# Patient Record
Sex: FEMALE | Race: WHITE
[De-identification: names, ages, dates, MRNs, and addresses within clinical notes are randomized per-mention and may not be internally consistent; named-entity substitution may affect disease eponyms.]

---

## 2019-04-12 ENCOUNTER — HOSPITAL ENCOUNTER (EMERGENCY)
Dept: HOSPITAL 5 - ED | Age: 49
LOS: 1 days | Discharge: HOME | End: 2019-04-13
Payer: COMMERCIAL

## 2019-04-12 DIAGNOSIS — R42: ICD-10-CM

## 2019-04-12 DIAGNOSIS — R20.2: Primary | ICD-10-CM

## 2019-04-12 PROCEDURE — 70450 CT HEAD/BRAIN W/O DYE: CPT

## 2019-04-12 PROCEDURE — 72125 CT NECK SPINE W/O DYE: CPT

## 2019-04-12 NOTE — EMERGENCY DEPARTMENT REPORT
ED General Adult HPI





- General


Chief complaint: Weakness


Stated complaint: BODY NUMBED


Time Seen by Provider: 04/12/19 21:51


Source: patient


Mode of arrival: Ambulatory


Limitations: No Limitations





- History of Present Illness


Initial comments: 





translated by her son


Pt is 47 yo female who presents to the ED with c/o tingling in the bilateral 

hands that began a month ago. She states that occasionally she also has a 

tingling sensation in BLE. She states her symptoms feel worse at night. The 

patient states occasionally she also has some dizziness upon standing. She has 

no dizziness currently. SHe denies any CP, SOB, fall, trauma, injury, HA, vision

changes, gait disturbance, speech disturbance, back or neck injury, or back or 

neck pain. Her only PMHx is HTN and she takes telmisartan and hctz. She has not 

seen a primary care doctor. 





- Related Data


                                    Allergies











Allergy/AdvReac Type Severity Reaction Status Date / Time


 


No Known Allergies Allergy   Verified 04/12/19 20:13














ED Review of Systems


ROS: 


Stated complaint: BODY NUMBED


Other details as noted in HPI





Comment: All other systems reviewed and negative





ED Past Medical Hx





- Past Medical History


Previous Medical History?: No





- Surgical History


Past Surgical History?: No





- Social History


Smoking Status: Never Smoker


Substance Use Type: None





ED Physical Exam





- General


Limitations: No Limitations


General appearance: alert, in no apparent distress





- Head


Head exam: Present: atraumatic, normocephalic





- Eye


Eye exam: Present: normal appearance, PERRL, EOMI





- ENT


ENT exam: Present: mucous membranes moist





- Neck


Neck exam: Present: normal inspection, full ROM.  Absent: tenderness





- Respiratory


Respiratory exam: Present: normal lung sounds bilaterally.  Absent: respiratory 

distress, wheezes, rales, rhonchi, stridor, chest wall tenderness, accessory 

muscle use, decreased breath sounds, prolonged expiratory





- Cardiovascular


Cardiovascular Exam: Present: regular rate, normal rhythm, normal heart sounds. 

Absent: systolic murmur, diastolic murmur, rubs, gallop





- Neurological Exam


Neurological exam: Present: alert, oriented X3, CN II-XII intact, normal gait, 

other (normal heel to shin, normal finger to nose, 5/5 strength in the BUE/BLE, 

sensation intact, no focal neuro deficit, good pulses throughout).  Absent: 

motor sensory deficit





- Psychiatric


Psychiatric exam: Present: normal affect, normal mood





- Skin


Skin exam: Present: warm, dry, intact





ED Course


                                   Vital Signs











  04/12/19 04/13/19





  19:58 00:52


 


Temperature 98.3 F 


 


Pulse Rate  72


 


Respiratory 17 17





Rate  


 


Blood Pressure 136/82 


 


Blood Pressure  129/84





[Left]  


 


O2 Sat by Pulse 96 98





Oximetry  














ED Medical Decision Making





- Radiology Data


Radiology results: report reviewed





PROCEDURE: CT CERVICAL SPINE WO CON 





TECHNIQUE: Computerized tomography of the cervical spine was performed from the 

skull base to T1 


without contrast material. 





CT DOSE LENGTH PRODUCT: 458.8 mGycm 





HISTORY: dizziness, tingling in the hands/feet 





COMPARISONS: None . 





FINDINGS: 





The alignment of the vertebral segments is normal. The heights of the vertebral 

bodies and the disc


spaces are maintained. No acute fracture or dislocation of the cervical spine. 

The spinal canal is 


adequate at all levels. 





IMPRESSION: Normal CT cervical spine . 








This document is electronically signed by Melanie Marshall DO., April 13 2019 

12:11:48 AM ET 








PROCEDURE: CT HEAD/BRAIN WO CON 





TECHNIQUE: Computerized tomography of the head was performed without contrast 

material. 





CT DOSE LENGTH PRODUCT: 805.4 mGycm 





HISTORY: dizziness, tingling in the hands/feet 





COMPARISONS: None . 





FINDINGS: 





Skull and scalp: Normal . 


Paranasal sinuses: Normal . 


Ventricles and subarachnoid spaces: Normal . 


Cerebrum: No evidence of hemorrhage, acute infarction or mass . 


Cerebellum and brainstem: No evidence of hemorrhage, acute infarction or mass . 


Vasculature: Normal . 


Other: None . 


ASPECTS: 10 





IMPRESSION: Normal Examination . 





This document is electronically signed by Melanie Marshall DO., April 13 2019 

12:10:19 AM ET 





- Medical Decision Making





translated by her son


Pt is 47 yo female who presents to the ED with c/o tingling in the bilateral 

hands that began a month ago. She states that occasionally she also has a 

tingling sensation in BLE. She states her symptoms feel worse at night. The 

patient states occasionally she also has some dizziness upon standing. She has 

no dizziness currently. SHe denies any CP, SOB, fall, trauma, injury, HA, vision

 changes, gait disturbance, speech disturbance, back or neck injury, or back or 

neck pain. Her only PMHx is HTN and she takes telmisartan and hctz. She has not 

seen a primary care doctor. VSS. CT head and c-spine with no acute process. Pt 

has no neuro deficit on examination. Will have pt follow up with a neurologist 

and PCP in the next 2-3 days. Advised pt to return to the emergency room for any

 new or worsening symptoms. 





- Differential Diagnosis


neuropathy, paresthesias, mass, MS 


Critical care attestation.: 


If time is entered above; I have spent that time in minutes in the direct care 

of this critically ill patient, excluding procedure time.








ED Disposition


Clinical Impression: 


 Paresthesia of upper and lower extremities of both sides





Disposition: DC-01 TO HOME OR SELFCARE


Is pt being admited?: No


Does the pt Need Aspirin: No


Condition: Stable


Instructions:  Paresthesia (ED)


Additional Instructions: 


Please follow up with a primary care doctor in the next 2-3 days. Please follow 

up with neurology in the next 2-3 days. Return to the emergency room for any new

 or worsening symptoms. 


[facility] 


[facility] [[facility] - 2-3 Days


J CARLOS RIVAS MD [Referring] - 2-3 Days


Time of Disposition: 00:20


Print Language: ENGLISH

## 2019-04-12 NOTE — EMERGENCY DEPARTMENT REPORT
Blank Doc





- Documentation


Documentation: 





49 y/o female comes in for tingling in hand and weakness.

## 2019-04-13 VITALS — DIASTOLIC BLOOD PRESSURE: 84 MMHG | SYSTOLIC BLOOD PRESSURE: 129 MMHG

## 2019-04-13 NOTE — CAT SCAN REPORT
PROCEDURE: CT CERVICAL SPINE WO CON 

 

TECHNIQUE:  Computerized tomography of the cervical spine was performed from the skull base to T1 wit
hout contrast material.   

 

CT DOSE LENGTH PRODUCT:  458.8  mGycm 

 

HISTORY:  dizziness, tingling in the hands/feet  

 

COMPARISONS:  None . 

 

FINDINGS: 

 

The alignment of the vertebral segments is normal. The heights of the vertebral bodies and the disc s
paces are maintained. No acute fracture or dislocation of the cervical spine. The spinal canal is eber
quate at all levels. 

 

IMPRESSION:  Normal CT cervical spine . 

 

 

This document is electronically signed by Melanie Marshall DO., April 13 2019 12:11:48 AM ET

## 2019-04-13 NOTE — CAT SCAN REPORT
PROCEDURE:  CT HEAD/BRAIN WO CON 

 

TECHNIQUE:  Computerized tomography of the head was performed without contrast material.  

 

CT DOSE LENGTH PRODUCT:  805.4  mGycm 

 

HISTORY:  dizziness, tingling in the hands/feet  

 

COMPARISONS:  None . 

 

FINDINGS: 

 

Skull and scalp:  Normal . 

Paranasal sinuses:  Normal . 

Ventricles and subarachnoid spaces:  Normal . 

Cerebrum:  No evidence of hemorrhage, acute infarction or mass . 

Cerebellum and brainstem:  No evidence of hemorrhage, acute infarction or mass . 

Vasculature:  Normal . 

Other:  None . 

ASPECTS: 10 

 

IMPRESSION:  Normal Examination . 

 

This document is electronically signed by Melanie Marshall DO., April 13 2019 12:10:19 AM ET